# Patient Record
Sex: MALE | Race: WHITE | NOT HISPANIC OR LATINO | Employment: OTHER | URBAN - METROPOLITAN AREA
[De-identification: names, ages, dates, MRNs, and addresses within clinical notes are randomized per-mention and may not be internally consistent; named-entity substitution may affect disease eponyms.]

---

## 2023-05-25 ENCOUNTER — OFFICE VISIT (OUTPATIENT)
Dept: PRIMARY CARE | Facility: CLINIC | Age: 88
End: 2023-05-25
Payer: COMMERCIAL

## 2023-05-25 VITALS
WEIGHT: 135 LBS | OXYGEN SATURATION: 98 % | HEART RATE: 52 BPM | TEMPERATURE: 97.1 F | SYSTOLIC BLOOD PRESSURE: 148 MMHG | DIASTOLIC BLOOD PRESSURE: 80 MMHG

## 2023-05-25 DIAGNOSIS — I70.0 ATHEROSCLEROSIS OF AORTA (CMS-HCC): ICD-10-CM

## 2023-05-25 DIAGNOSIS — I71.40 ABDOMINAL AORTIC ANEURYSM (AAA) WITHOUT RUPTURE, UNSPECIFIED PART (CMS-HCC): ICD-10-CM

## 2023-05-25 DIAGNOSIS — D64.9 ANEMIA, UNSPECIFIED TYPE: ICD-10-CM

## 2023-05-25 DIAGNOSIS — I25.10 CORONARY ARTERY DISEASE INVOLVING NATIVE CORONARY ARTERY OF NATIVE HEART WITHOUT ANGINA PECTORIS: ICD-10-CM

## 2023-05-25 DIAGNOSIS — I10 ESSENTIAL HYPERTENSION: Primary | ICD-10-CM

## 2023-05-25 DIAGNOSIS — N18.30 STAGE 3 CHRONIC KIDNEY DISEASE, UNSPECIFIED WHETHER STAGE 3A OR 3B CKD (MULTI): ICD-10-CM

## 2023-05-25 PROBLEM — K59.00 CONSTIPATION: Status: ACTIVE | Noted: 2019-03-20

## 2023-05-25 PROBLEM — D01.0 CARCINOMA IN SITU OF COLON: Status: ACTIVE | Noted: 2017-07-25

## 2023-05-25 PROBLEM — D07.5 CARCINOMA IN SITU OF PROSTATE: Status: ACTIVE | Noted: 2017-07-25

## 2023-05-25 PROBLEM — N18.31 CHRONIC KIDNEY DISEASE, STAGE 3A (MULTI): Status: ACTIVE | Noted: 2019-03-20

## 2023-05-25 PROBLEM — K21.9 GASTROESOPHAGEAL REFLUX DISEASE: Status: ACTIVE | Noted: 2019-03-20

## 2023-05-25 PROBLEM — Z98.61 STATUS POST PERCUTANEOUS TRANSLUMINAL CORONARY ANGIOPLASTY: Status: ACTIVE | Noted: 2020-07-29

## 2023-05-25 PROBLEM — E78.5 HYPERLIPIDEMIA: Status: ACTIVE | Noted: 2017-01-01

## 2023-05-25 PROBLEM — K40.90 INGUINAL HERNIA: Status: ACTIVE | Noted: 2020-07-29

## 2023-05-25 PROBLEM — D63.1 ANEMIA OF CHRONIC RENAL FAILURE: Status: ACTIVE | Noted: 2019-03-20

## 2023-05-25 PROBLEM — N18.9 ANEMIA OF CHRONIC RENAL FAILURE: Status: ACTIVE | Noted: 2019-03-20

## 2023-05-25 PROCEDURE — 3077F SYST BP >= 140 MM HG: CPT | Performed by: PHYSICIAN ASSISTANT

## 2023-05-25 PROCEDURE — 3079F DIAST BP 80-89 MM HG: CPT | Performed by: PHYSICIAN ASSISTANT

## 2023-05-25 PROCEDURE — 1036F TOBACCO NON-USER: CPT | Performed by: PHYSICIAN ASSISTANT

## 2023-05-25 PROCEDURE — 1159F MED LIST DOCD IN RCRD: CPT | Performed by: PHYSICIAN ASSISTANT

## 2023-05-25 PROCEDURE — 99214 OFFICE O/P EST MOD 30 MIN: CPT | Performed by: PHYSICIAN ASSISTANT

## 2023-05-25 PROCEDURE — 1160F RVW MEDS BY RX/DR IN RCRD: CPT | Performed by: PHYSICIAN ASSISTANT

## 2023-05-25 RX ORDER — AMLODIPINE BESYLATE 10 MG/1
10 TABLET ORAL DAILY
Qty: 90 TABLET | Refills: 1 | Status: SHIPPED | OUTPATIENT
Start: 2023-05-25 | End: 2023-09-29 | Stop reason: SDUPTHER

## 2023-05-25 RX ORDER — LATANOPROST 50 UG/ML
1 SOLUTION/ DROPS OPHTHALMIC DAILY
COMMUNITY
Start: 2023-02-27

## 2023-05-25 RX ORDER — DOCUSATE SODIUM 100 MG/1
100 CAPSULE, LIQUID FILLED ORAL 2 TIMES DAILY
COMMUNITY
Start: 2018-12-12 | End: 2023-05-25 | Stop reason: WASHOUT

## 2023-05-25 RX ORDER — AMLODIPINE BESYLATE 10 MG/1
10 TABLET ORAL DAILY
COMMUNITY
Start: 2021-01-26 | End: 2023-05-25 | Stop reason: SDUPTHER

## 2023-05-25 RX ORDER — LANOLIN ALCOHOL/MO/W.PET/CERES
100 CREAM (GRAM) TOPICAL DAILY
COMMUNITY

## 2023-05-25 RX ORDER — ASPIRIN 81 MG/1
81 TABLET ORAL DAILY
COMMUNITY

## 2023-05-25 ASSESSMENT — ENCOUNTER SYMPTOMS
PALPITATIONS: 0
ABDOMINAL PAIN: 0
LIGHT-HEADEDNESS: 0

## 2023-08-15 ENCOUNTER — OFFICE VISIT (OUTPATIENT)
Dept: PRIMARY CARE | Facility: CLINIC | Age: 88
End: 2023-08-15
Payer: COMMERCIAL

## 2023-08-15 VITALS
SYSTOLIC BLOOD PRESSURE: 130 MMHG | TEMPERATURE: 97 F | OXYGEN SATURATION: 97 % | HEART RATE: 49 BPM | WEIGHT: 136 LBS | DIASTOLIC BLOOD PRESSURE: 90 MMHG

## 2023-08-15 DIAGNOSIS — R35.1 NOCTURIA: ICD-10-CM

## 2023-08-15 DIAGNOSIS — R30.0 BURNING WITH URINATION: Primary | ICD-10-CM

## 2023-08-15 LAB
POC APPEARANCE, URINE: CLEAR
POC BILIRUBIN, URINE: NEGATIVE
POC BLOOD, URINE: NEGATIVE
POC COLOR, URINE: YELLOW
POC GLUCOSE, URINE: NEGATIVE MG/DL
POC KETONES, URINE: NEGATIVE MG/DL
POC LEUKOCYTES, URINE: NEGATIVE
POC NITRITE,URINE: NEGATIVE
POC PH, URINE: 7 PH
POC PROTEIN, URINE: ABNORMAL MG/DL
POC SPECIFIC GRAVITY, URINE: 1.01
POC UROBILINOGEN, URINE: 0.2 EU/DL

## 2023-08-15 PROCEDURE — 1159F MED LIST DOCD IN RCRD: CPT | Performed by: PHYSICIAN ASSISTANT

## 2023-08-15 PROCEDURE — 3080F DIAST BP >= 90 MM HG: CPT | Performed by: PHYSICIAN ASSISTANT

## 2023-08-15 PROCEDURE — 99214 OFFICE O/P EST MOD 30 MIN: CPT | Performed by: PHYSICIAN ASSISTANT

## 2023-08-15 PROCEDURE — 3075F SYST BP GE 130 - 139MM HG: CPT | Performed by: PHYSICIAN ASSISTANT

## 2023-08-15 PROCEDURE — 1036F TOBACCO NON-USER: CPT | Performed by: PHYSICIAN ASSISTANT

## 2023-08-15 PROCEDURE — 81003 URINALYSIS AUTO W/O SCOPE: CPT | Performed by: PHYSICIAN ASSISTANT

## 2023-08-15 PROCEDURE — 87086 URINE CULTURE/COLONY COUNT: CPT

## 2023-08-15 PROCEDURE — 1160F RVW MEDS BY RX/DR IN RCRD: CPT | Performed by: PHYSICIAN ASSISTANT

## 2023-08-15 RX ORDER — SULFAMETHOXAZOLE AND TRIMETHOPRIM 800; 160 MG/1; MG/1
1 TABLET ORAL 2 TIMES DAILY
Qty: 6 TABLET | Refills: 0 | Status: SHIPPED | OUTPATIENT
Start: 2023-08-15 | End: 2023-08-18

## 2023-08-15 RX ORDER — TAMSULOSIN HYDROCHLORIDE 0.4 MG/1
0.4 CAPSULE ORAL DAILY
Qty: 30 CAPSULE | Refills: 3 | Status: SHIPPED | OUTPATIENT
Start: 2023-08-15 | End: 2023-11-27 | Stop reason: SDUPTHER

## 2023-08-15 ASSESSMENT — ENCOUNTER SYMPTOMS
ABDOMINAL PAIN: 0
PALPITATIONS: 0
SHORTNESS OF BREATH: 0

## 2023-08-15 NOTE — PROGRESS NOTES
Subjective   Patient ID: Massimo Perez is a 89 y.o. male who presents for burning with urination x 2 wks.    HPI   Patient complains of having mild dysuria x 2 weeks.  Has urinary frequency. No abdominal pain or flank pain. No fevers/chillsno OMov    Also complains of chronic nocturia increasing over the past few years-- wakes 3-4 times per night to urinate. Wants to try some medicine to help this.      reports that he quit smoking about 69 years ago. His smoking use included cigarettes. He has never used smokeless tobacco.    Review of Systems   Respiratory:  Negative for shortness of breath.    Cardiovascular:  Negative for chest pain and palpitations.   Gastrointestinal:  Negative for abdominal pain.       Objective   /90   Pulse (!) 49   Temp 36.1 °C (97 °F)   Wt 61.7 kg (136 lb)   SpO2 97%     Physical Exam  HENT:      Head: Normocephalic.   Eyes:      General: No scleral icterus.  Cardiovascular:      Rate and Rhythm: Normal rate and regular rhythm.   Pulmonary:      Effort: Pulmonary effort is normal.      Breath sounds: Normal breath sounds.   Abdominal:      Palpations: Abdomen is soft. There is no mass.      Tenderness: There is no abdominal tenderness. There is no right CVA tenderness or left CVA tenderness.   Skin:     General: Skin is warm and dry.   Neurological:      Mental Status: He is alert.   Psychiatric:         Mood and Affect: Affect normal.       Office Visit on 08/15/2023   Component Date Value Ref Range Status    POC Color, Urine 08/15/2023 Yellow  Straw, Yellow, Light Yellow Final    POC Appearance, Urine 08/15/2023 Clear  Clear Final    POC Specific Gravity, Urine 08/15/2023 1.015  1.005 - 1.035 Final    POC PH, Urine 08/15/2023 7.0  No Reference Range Established PH Final    POC Protein, Urine 08/15/2023 TRACE (A)  NEGATIVE, 30 (1+) mg/dl Final    POC Glucose, Urine 08/15/2023 NEGATIVE  NEGATIVE mg/dl Final    POC Blood, Urine 08/15/2023 NEGATIVE  NEGATIVE Final    POC Ketones,  Urine 08/15/2023 NEGATIVE  NEGATIVE mg/dl Final    POC Bilirubin, Urine 08/15/2023 NEGATIVE  NEGATIVE Final    POC Urobilinogen, Urine 08/15/2023 0.2  0.2, 1.0 EU/DL Final    Poc Nitrate, Urine 08/15/2023 NEGATIVE  NEGATIVE Final    POC Leukocytes, Urine 08/15/2023 NEGATIVE  NEGATIVE Final    Urine Culture 08/15/2023 **Culture Comments - See Below   Final          Assessment/Plan   Diagnoses and all orders for this visit:  Burning with urination  -     POCT UA Automated manually resulted  -     Urine Culture  -     sulfamethoxazole-trimethoprim (Bactrim DS) 800-160 mg tablet; Take 1 tablet by mouth 2 times a day for 3 days.  Nocturia  -     tamsulosin (Flomax) 0.4 mg 24 hr capsule; Take 1 capsule (0.4 mg) by mouth once daily.       Reviewed UA. Send urine for cx.   Cover with Bactrim bid x 3 days  Discussed probable prostatic enlargement  that is contributing to nocturia. Start Rx Fllomax.   Hydrate well.   Follow up if symptoms increase, otherwise follow up for routine recheck in 11/2023 as scheduled.

## 2023-08-17 LAB — URINE CULTURE: NORMAL

## 2023-09-29 ENCOUNTER — TELEPHONE (OUTPATIENT)
Dept: PRIMARY CARE | Facility: CLINIC | Age: 88
End: 2023-09-29
Payer: COMMERCIAL

## 2023-09-29 DIAGNOSIS — I10 ESSENTIAL HYPERTENSION: ICD-10-CM

## 2023-09-29 RX ORDER — AMLODIPINE BESYLATE 10 MG/1
10 TABLET ORAL DAILY
Qty: 90 TABLET | Refills: 0 | Status: SHIPPED | OUTPATIENT
Start: 2023-09-29 | End: 2023-11-27 | Stop reason: SDUPTHER

## 2023-09-29 NOTE — TELEPHONE ENCOUNTER
Pt requests a refill on Amlodipine 10mg. He also requests to bump the prescription up if possible. Pt uses [facility]

## 2023-11-27 ENCOUNTER — LAB (OUTPATIENT)
Dept: LAB | Facility: LAB | Age: 88
End: 2023-11-27
Payer: COMMERCIAL

## 2023-11-27 ENCOUNTER — OFFICE VISIT (OUTPATIENT)
Dept: PRIMARY CARE | Facility: CLINIC | Age: 88
End: 2023-11-27
Payer: COMMERCIAL

## 2023-11-27 VITALS
OXYGEN SATURATION: 97 % | HEIGHT: 72 IN | BODY MASS INDEX: 18.28 KG/M2 | WEIGHT: 135 LBS | SYSTOLIC BLOOD PRESSURE: 140 MMHG | TEMPERATURE: 96.7 F | HEART RATE: 68 BPM | DIASTOLIC BLOOD PRESSURE: 80 MMHG

## 2023-11-27 DIAGNOSIS — I10 ESSENTIAL HYPERTENSION: Primary | ICD-10-CM

## 2023-11-27 DIAGNOSIS — R35.1 NOCTURIA: ICD-10-CM

## 2023-11-27 DIAGNOSIS — N18.30 STAGE 3 CHRONIC KIDNEY DISEASE, UNSPECIFIED WHETHER STAGE 3A OR 3B CKD (MULTI): ICD-10-CM

## 2023-11-27 DIAGNOSIS — I70.0 ATHEROSCLEROSIS OF AORTA (CMS-HCC): ICD-10-CM

## 2023-11-27 DIAGNOSIS — I10 ESSENTIAL HYPERTENSION: ICD-10-CM

## 2023-11-27 DIAGNOSIS — I70.1 ATHEROSCLEROSIS OF RENAL ARTERY (CMS-HCC): ICD-10-CM

## 2023-11-27 DIAGNOSIS — I71.40 ABDOMINAL AORTIC ANEURYSM (AAA) WITHOUT RUPTURE, UNSPECIFIED PART (CMS-HCC): ICD-10-CM

## 2023-11-27 DIAGNOSIS — I25.10 CORONARY ARTERY DISEASE INVOLVING NATIVE CORONARY ARTERY OF NATIVE HEART WITHOUT ANGINA PECTORIS: ICD-10-CM

## 2023-11-27 DIAGNOSIS — D64.9 ANEMIA, UNSPECIFIED TYPE: ICD-10-CM

## 2023-11-27 DIAGNOSIS — Z00.00 ROUTINE GENERAL MEDICAL EXAMINATION AT HEALTH CARE FACILITY: ICD-10-CM

## 2023-11-27 PROBLEM — Z85.46 HISTORY OF PROSTATE CANCER: Status: ACTIVE | Noted: 2023-11-27

## 2023-11-27 PROBLEM — Z85.038 HISTORY OF COLON CANCER: Status: ACTIVE | Noted: 2023-11-27

## 2023-11-27 LAB
ANION GAP SERPL CALC-SCNC: 11 MMOL/L (ref 10–20)
BASOPHILS # BLD AUTO: 0.04 X10*3/UL (ref 0–0.1)
BASOPHILS NFR BLD AUTO: 0.8 %
BUN SERPL-MCNC: 22 MG/DL (ref 6–23)
CALCIUM SERPL-MCNC: 9.8 MG/DL (ref 8.6–10.3)
CHLORIDE SERPL-SCNC: 105 MMOL/L (ref 98–107)
CO2 SERPL-SCNC: 27 MMOL/L (ref 21–32)
CREAT SERPL-MCNC: 1.64 MG/DL (ref 0.5–1.3)
EOSINOPHIL # BLD AUTO: 0.06 X10*3/UL (ref 0–0.4)
EOSINOPHIL NFR BLD AUTO: 1.1 %
ERYTHROCYTE [DISTWIDTH] IN BLOOD BY AUTOMATED COUNT: 14 % (ref 11.5–14.5)
FERRITIN SERPL-MCNC: 167 NG/ML (ref 20–300)
GFR SERPL CREATININE-BSD FRML MDRD: 39 ML/MIN/1.73M*2
GLUCOSE SERPL-MCNC: 87 MG/DL (ref 74–99)
HCT VFR BLD AUTO: 36 % (ref 41–52)
HGB BLD-MCNC: 11.7 G/DL (ref 13.5–17.5)
IMM GRANULOCYTES # BLD AUTO: 0.02 X10*3/UL (ref 0–0.5)
IMM GRANULOCYTES NFR BLD AUTO: 0.4 % (ref 0–0.9)
IRON SERPL-MCNC: 91 UG/DL (ref 35–150)
LYMPHOCYTES # BLD AUTO: 1.62 X10*3/UL (ref 0.8–3)
LYMPHOCYTES NFR BLD AUTO: 30.5 %
MCH RBC QN AUTO: 33.5 PG (ref 26–34)
MCHC RBC AUTO-ENTMCNC: 32.5 G/DL (ref 32–36)
MCV RBC AUTO: 103 FL (ref 80–100)
MONOCYTES # BLD AUTO: 0.64 X10*3/UL (ref 0.05–0.8)
MONOCYTES NFR BLD AUTO: 12.1 %
NEUTROPHILS # BLD AUTO: 2.93 X10*3/UL (ref 1.6–5.5)
NEUTROPHILS NFR BLD AUTO: 55.1 %
NRBC BLD-RTO: 0 /100 WBCS (ref 0–0)
PLATELET # BLD AUTO: 186 X10*3/UL (ref 150–450)
POTASSIUM SERPL-SCNC: 4.7 MMOL/L (ref 3.5–5.3)
RBC # BLD AUTO: 3.49 X10*6/UL (ref 4.5–5.9)
SODIUM SERPL-SCNC: 138 MMOL/L (ref 136–145)
VIT B12 SERPL-MCNC: 343 PG/ML (ref 211–911)
WBC # BLD AUTO: 5.3 X10*3/UL (ref 4.4–11.3)

## 2023-11-27 PROCEDURE — 1160F RVW MEDS BY RX/DR IN RCRD: CPT | Performed by: PHYSICIAN ASSISTANT

## 2023-11-27 PROCEDURE — 82607 VITAMIN B-12: CPT

## 2023-11-27 PROCEDURE — 99214 OFFICE O/P EST MOD 30 MIN: CPT | Performed by: PHYSICIAN ASSISTANT

## 2023-11-27 PROCEDURE — 1036F TOBACCO NON-USER: CPT | Performed by: PHYSICIAN ASSISTANT

## 2023-11-27 PROCEDURE — 82728 ASSAY OF FERRITIN: CPT

## 2023-11-27 PROCEDURE — 3079F DIAST BP 80-89 MM HG: CPT | Performed by: PHYSICIAN ASSISTANT

## 2023-11-27 PROCEDURE — 36415 COLL VENOUS BLD VENIPUNCTURE: CPT

## 2023-11-27 PROCEDURE — 85025 COMPLETE CBC W/AUTO DIFF WBC: CPT

## 2023-11-27 PROCEDURE — 80048 BASIC METABOLIC PNL TOTAL CA: CPT

## 2023-11-27 PROCEDURE — 1159F MED LIST DOCD IN RCRD: CPT | Performed by: PHYSICIAN ASSISTANT

## 2023-11-27 PROCEDURE — 1170F FXNL STATUS ASSESSED: CPT | Performed by: PHYSICIAN ASSISTANT

## 2023-11-27 PROCEDURE — G0439 PPPS, SUBSEQ VISIT: HCPCS | Performed by: PHYSICIAN ASSISTANT

## 2023-11-27 PROCEDURE — 83540 ASSAY OF IRON: CPT

## 2023-11-27 PROCEDURE — 3077F SYST BP >= 140 MM HG: CPT | Performed by: PHYSICIAN ASSISTANT

## 2023-11-27 RX ORDER — AMLODIPINE BESYLATE 10 MG/1
10 TABLET ORAL DAILY
Qty: 90 TABLET | Refills: 1 | Status: SHIPPED | OUTPATIENT
Start: 2023-11-27 | End: 2024-01-17 | Stop reason: SDUPTHER

## 2023-11-27 RX ORDER — TAMSULOSIN HYDROCHLORIDE 0.4 MG/1
0.4 CAPSULE ORAL DAILY
Qty: 30 CAPSULE | Refills: 5 | Status: SHIPPED | OUTPATIENT
Start: 2023-11-27

## 2023-11-27 ASSESSMENT — ACTIVITIES OF DAILY LIVING (ADL)
BATHING: INDEPENDENT
DOING_HOUSEWORK: INDEPENDENT
DRESSING: INDEPENDENT
TAKING_MEDICATION: INDEPENDENT
MANAGING_FINANCES: INDEPENDENT
GROCERY_SHOPPING: INDEPENDENT

## 2023-11-27 ASSESSMENT — ENCOUNTER SYMPTOMS
LIGHT-HEADEDNESS: 0
PALPITATIONS: 0
ABDOMINAL PAIN: 0

## 2023-11-27 ASSESSMENT — PATIENT HEALTH QUESTIONNAIRE - PHQ9
1. LITTLE INTEREST OR PLEASURE IN DOING THINGS: NOT AT ALL
2. FEELING DOWN, DEPRESSED OR HOPELESS: NOT AT ALL
SUM OF ALL RESPONSES TO PHQ9 QUESTIONS 1 AND 2: 0

## 2023-11-27 NOTE — PROGRESS NOTES
Subjective   Patient ID: Massimo SANGITA Chris is a 89 y.o. male who presents for Medicare Annual Wellness Visit Subsequent and Hypertension (Ckd recheck ).    HPI   Patient presents for recheck of HTN CKD, and anemia.  And AWE.     HTN: Taking and tolerating amlodipine. BP stable today. States he sometimes is missing taking his medicine. If BP is really high when he checks it, he will then take 2 pills.  Denies headache or dizziness.    Severe atherosclerosis of Aorta, mesenteric and renal arteries - denies abdominal pain or chest pains.  Declines further evaluation of this.     AAA: slight growth on CT 2021 but still considered small. No symptoms.  Declines further evaluation of it.     Anemia: chronic and stable. Hgb 12.2 on 22 labs. Takes B12.  Did not get labs for this visit.     CKD 3a: Creatinine 1.38 and GFR 48 on labs 22.     Continues to see eye Dr Peralta and Dr Davison.      reports that he quit smoking about 69 years ago. His smoking use included cigarettes. He has never used smokeless tobacco.    Stays active.  Planted a garden with lots of tomatoes. Gets some aches in knees from arthritis.   Rides an exercise bike a little.     In 10/2022 his son (Dr. Massimo Perez, [facility])  from [place].     Gets nocturia for several years. Never started the Flomax after last visit.   Nocturia about 3x per night.       Review of Systems   Cardiovascular:  Negative for chest pain and palpitations.   Gastrointestinal:  Negative for abdominal pain.   Neurological:  Negative for light-headedness.         Objective   /80   Pulse 68   Temp 35.9 °C (96.7 °F)   Ht 1.829 m (6')   Wt 61.2 kg (135 lb)   SpO2 97%   BMI 18.31 kg/m²     Physical Exam  HENT:      Head: Normocephalic.   Eyes:      General: No scleral icterus.  Cardiovascular:      Rate and Rhythm: Normal rate and regular rhythm.   Pulmonary:      Effort: Pulmonary effort is normal.      Breath sounds: Normal breath sounds.   Abdominal:      Palpations:  Abdomen is soft. There is no mass.      Tenderness: There is no abdominal tenderness.   Skin:     General: Skin is warm and dry.   Neurological:      Mental Status: He is alert.   Psychiatric:         Mood and Affect: Affect normal.           Assessment/Plan   Diagnoses and all orders for this visit:  Essential hypertension  -     amLODIPine (Norvasc) 10 mg tablet; Take 1 tablet (10 mg) by mouth once daily.  -     Basic Metabolic Panel; Future  -     CBC and Auto Differential; Future  Anemia, unspecified type  -     CBC and Auto Differential; Future  -     Iron; Future  -     Vitamin B12; Future  -     Ferritin; Future  Coronary artery disease involving native coronary artery of native heart without angina pectoris  Stage 3 chronic kidney disease, unspecified whether stage 3a or 3b CKD (CMS/HCC)  -     Basic Metabolic Panel; Future  Atherosclerosis of aorta (CMS/HCC)  Abdominal aortic aneurysm (AAA) without rupture, unspecified part (CMS/[facility])  Nocturia  -     tamsulosin (Flomax) 0.4 mg 24 hr capsule; Take 1 capsule (0.4 mg) by mouth once daily.  Routine general medical examination at health care facility  Atherosclerosis of renal artery (CMS/HCC)       Discussed importance of compliance with taking his blood pressure medication every day.  Monitor blood pressure.  Get labs  Discussed probable prostatic enlargement  that is contributing to nocturia. Start Rx Flomax.    Limit sodium in diet.  Encouraged to stay active.  Follow-up in 6 months for recheck HTN, CKD, Anemia, earlier if needed.

## 2023-11-27 NOTE — PROGRESS NOTES
Subjective   Reason for Visit: Massimo Perez is an 89 y.o. male here for a Medicare Wellness visit.     Past Medical, Surgical, and Family History reviewed and updated in chart.    Reviewed all medications by prescribing practitioner or clinical pharmacist (such as prescriptions, OTCs, herbal therapies and supplements) and documented in the medical record.    HPI    Annual Wellness visit.       Reviewed patient's answers to all Medicare screening questionnaire questions regarding falls, depression, general health status, nutrition and exercise, functional ability/level of safety, home safety risks, and ADLs/IADLs.      Healthcare POA and Living Will status:  has this in place -- his daughter.      Reviewed meds and discussed consistent use of meds as prescribed. Is not taking any high risk controlled/opioid medications.        Immunizations: recommend yearly flu shot in the fall. Got this last month.      No trouble with bathing, dressing, preparing meals, eating, with mobility, toileting, house work, managing money, medications, using telephone, shopping or transportation.  Reports no falls.   The home has grab bars in the bathroom and handrails on the stairs. The home does not have poor lighting, clutter, uneven floors, or loose rugs.  No diet restrictions.    Wears hearing aids.      Bladder - continent, but has nocturia x 2-3.   Reports good urinary stream  Bowel - continent     Opioid use - no.  Medications reviewed.   Patient  is compliant with medications: Yes  High risk medications: None     Exercise -  yes,  does strengthening and toning. Stays active.  Planted a garden with lots of tomatoes. Gets some aches in knees from arthritis.   Rides an exercise bike a little.     Diet - well balanced, heart healthy.  Pain score - 1/10 (aches in knees)     Lives alone.  Wife    Has 2 daughters that are involved -- his son, Dr Massimo Perez,  10/2022 from [place].      Cognitive status is good. Answers  questions, discusses medical problems voices complaints coherently and appropriately. Does not repeat himself regarding comments or questions.      Reviewed all diagnosis with patient.  Additional diagnosis: None  Comorbid conditions: HTN, HLD < vascular disease       Screening:  Patient is a diabetic:   No  Colonoscopy: not indicated  age         Providers:  [facility]   Sees eye Dr Peralta and Dr Davison.   Patient Care Team:  Kali Joe PA-C as PCP - General  Kali Joe PA-C as PCP - [employer] PCP     Review of Systems   Respiratory:  Negative for shortness of breath.    Cardiovascular:  Negative for chest pain and palpitations.   Gastrointestinal:  Negative for abdominal pain.       Objective   Vitals:  /80   Pulse 68   Temp 35.9 °C (96.7 °F)   Ht 1.829 m (6')   Wt 61.2 kg (135 lb)   SpO2 97%   BMI 18.31 kg/m²       Physical Exam  Vitals and nursing note reviewed.   HENT:      Head: Normocephalic.   Eyes:      General: No scleral icterus.  Cardiovascular:      Rate and Rhythm: Normal rate and regular rhythm.   Pulmonary:      Effort: Pulmonary effort is normal.      Breath sounds: Normal breath sounds.   Abdominal:      Palpations: Abdomen is soft. There is no mass.      Tenderness: There is no abdominal tenderness.   Skin:     General: Skin is warm and dry.   Neurological:      Mental Status: He is alert.   Psychiatric:         Mood and Affect: Affect normal.         Assessment/Plan   Problem List Items Addressed This Visit       Abdominal aortic aneurysm without rupture (CMS/HCC)    Atherosclerosis of aorta (CMS/HCC)    Coronary artery disease involving native coronary artery of native heart without angina pectoris    Relevant Medications    amLODIPine (Norvasc) 10 mg tablet    Essential hypertension - Primary    Relevant Medications    amLODIPine (Norvasc) 10 mg tablet    Other Relevant Orders    Basic Metabolic Panel (Completed)    CBC and Auto Differential  (Completed)    Anemia    Relevant Orders    CBC and Auto Differential (Completed)    Iron (Completed)    Vitamin B12 (Completed)    Ferritin (Completed)    Atherosclerosis of renal artery (CMS/HCC)     Other Visit Diagnoses       Stage 3 chronic kidney disease, unspecified whether stage 3a or 3b CKD (CMS/HCC)        Relevant Orders    Basic Metabolic Panel (Completed)    Nocturia        Relevant Medications    tamsulosin (Flomax) 0.4 mg 24 hr capsule    Routine general medical examination at health care facility                 Discussed wellness issues.   Discussed diet.   Gets Flu shot yearly. Got COVID vaccine booster recently.   Continue healthy diet and exercise.

## 2023-12-01 ENCOUNTER — TELEPHONE (OUTPATIENT)
Dept: PRIMARY CARE | Facility: CLINIC | Age: 88
End: 2023-12-01
Payer: COMMERCIAL

## 2023-12-01 ASSESSMENT — ENCOUNTER SYMPTOMS
ABDOMINAL PAIN: 0
PALPITATIONS: 0
SHORTNESS OF BREATH: 0

## 2023-12-01 NOTE — TELEPHONE ENCOUNTER
----- Message from Kali Joe PA-C sent at 12/1/2023 12:39 AM EST -----  Inform pt that his labs showed that his anemia is slightly worse. I would like for him to start taking an OTC Vit B12 supplement. Get Vit B12 1000mcg 1 pill per day -- this should help this improve. His kidney function is slightly lower than it was before.  Make sure he avoids using any ibuprofen, Advil, and aleve (Tylenol is ok).  Make sure drinking plenty of water too.  Rest of labs all ok.

## 2024-01-01 ENCOUNTER — TELEPHONE (OUTPATIENT)
Dept: PRIMARY CARE | Facility: CLINIC | Age: 89
End: 2024-01-01
Payer: MEDICARE

## 2024-01-17 DIAGNOSIS — I10 ESSENTIAL HYPERTENSION: ICD-10-CM

## 2024-01-17 RX ORDER — AMLODIPINE BESYLATE 10 MG/1
10 TABLET ORAL DAILY
Qty: 90 TABLET | Refills: 1 | Status: SHIPPED | OUTPATIENT
Start: 2024-01-17

## 2024-01-17 NOTE — TELEPHONE ENCOUNTER
Pt has insurance update and now uses [facility]. Pt would like a refill on Amlodipine 10MG, 90 days. Pt has about a week or more left pls advise. Next ov 5/28 last ov 11/27

## 2024-06-19 ENCOUNTER — TELEPHONE (OUTPATIENT)
Dept: PRIMARY CARE | Facility: CLINIC | Age: 89
End: 2024-06-19
Payer: MEDICARE

## 2024-06-19 NOTE — TELEPHONE ENCOUNTER
Pt came in asking for a 90 day supply refill on his amlodipine. Only has 3 pills left.   Please send to [facility]

## 2024-06-21 ENCOUNTER — OFFICE VISIT (OUTPATIENT)
Dept: PRIMARY CARE | Facility: CLINIC | Age: 89
End: 2024-06-21
Payer: MEDICARE

## 2024-06-21 VITALS
DIASTOLIC BLOOD PRESSURE: 58 MMHG | WEIGHT: 126 LBS | BODY MASS INDEX: 17.09 KG/M2 | SYSTOLIC BLOOD PRESSURE: 130 MMHG | TEMPERATURE: 97.4 F | HEART RATE: 60 BPM

## 2024-06-21 DIAGNOSIS — R35.1 NOCTURIA: ICD-10-CM

## 2024-06-21 DIAGNOSIS — I10 ESSENTIAL HYPERTENSION: Primary | ICD-10-CM

## 2024-06-21 DIAGNOSIS — N18.30 STAGE 3 CHRONIC KIDNEY DISEASE, UNSPECIFIED WHETHER STAGE 3A OR 3B CKD (MULTI): ICD-10-CM

## 2024-06-21 DIAGNOSIS — D64.9 ANEMIA, UNSPECIFIED TYPE: ICD-10-CM

## 2024-06-21 DIAGNOSIS — I71.40 ABDOMINAL AORTIC ANEURYSM (AAA) WITHOUT RUPTURE, UNSPECIFIED PART (CMS-HCC): ICD-10-CM

## 2024-06-21 DIAGNOSIS — I70.1 ATHEROSCLEROSIS OF RENAL ARTERY (CMS-HCC): ICD-10-CM

## 2024-06-21 DIAGNOSIS — I25.10 CORONARY ARTERY DISEASE INVOLVING NATIVE CORONARY ARTERY OF NATIVE HEART WITHOUT ANGINA PECTORIS: ICD-10-CM

## 2024-06-21 DIAGNOSIS — I70.0 ATHEROSCLEROSIS OF AORTA (CMS-HCC): ICD-10-CM

## 2024-06-21 PROBLEM — D01.0 CARCINOMA IN SITU OF COLON: Status: RESOLVED | Noted: 2017-07-25 | Resolved: 2024-06-21

## 2024-06-21 PROCEDURE — 99214 OFFICE O/P EST MOD 30 MIN: CPT | Performed by: PHYSICIAN ASSISTANT

## 2024-06-21 PROCEDURE — 1036F TOBACCO NON-USER: CPT | Performed by: PHYSICIAN ASSISTANT

## 2024-06-21 PROCEDURE — 1160F RVW MEDS BY RX/DR IN RCRD: CPT | Performed by: PHYSICIAN ASSISTANT

## 2024-06-21 PROCEDURE — 1159F MED LIST DOCD IN RCRD: CPT | Performed by: PHYSICIAN ASSISTANT

## 2024-06-21 PROCEDURE — 3078F DIAST BP <80 MM HG: CPT | Performed by: PHYSICIAN ASSISTANT

## 2024-06-21 PROCEDURE — 3075F SYST BP GE 130 - 139MM HG: CPT | Performed by: PHYSICIAN ASSISTANT

## 2024-06-21 RX ORDER — AMLODIPINE BESYLATE 10 MG/1
10 TABLET ORAL DAILY
Qty: 90 TABLET | Refills: 1 | Status: SHIPPED | OUTPATIENT
Start: 2024-06-21

## 2024-06-21 RX ORDER — TAMSULOSIN HYDROCHLORIDE 0.4 MG/1
0.4 CAPSULE ORAL DAILY
Qty: 90 CAPSULE | Refills: 2 | Status: SHIPPED | OUTPATIENT
Start: 2024-06-21

## 2024-06-21 ASSESSMENT — ENCOUNTER SYMPTOMS
PALPITATIONS: 0
ABDOMINAL PAIN: 0
LIGHT-HEADEDNESS: 0

## 2024-06-21 NOTE — PROGRESS NOTES
Subjective   Patient ID: Massimo Perez is a 89 y.o. male who presents for Hypertension (Recheck ).    HPI   Patient presents for recheck of HTN CKD, and anemia.      HTN: Taking and tolerating amlodipine. BP has been good.  Denies headache or dizziness.    Severe atherosclerosis of Aorta, mesenteric and renal arteries - denies abdominal pain or chest pains.  Declines further evaluation of this.     AAA: slight growth on CT 2021 but still considered small. No symptoms.  Declines further evaluation of it.     Anemia: chronic and fairly stable.  Takes B12 and sometimes takes a little iron.  Did not get labs for this visit. Energy level stable. No blood or melena in stools.      Lab Results   Component Value Date    HGB 11.7 (L) 2023    HGB 12.2 (L) 2022    IRON 91 2023    IRON 65 2021    FERRITIN 167 2023    FERRITIN 247 2021    MOMTIIWS96 343 2023    [id number] 427 2021       CKD 3a:  Slightly higher creatinine on 23 labs.   Lab Results   Component Value Date    CREATININE 1.64 (H) 2023    CREATININE 1.38 (H) 2022    CREATININE 1.07 2021    GFRMALE 48 (A) 2022     Nocturia: Started Flomax after last visit and it seems to help reduce the amount of nocturia.       Continues to see eye Dr Peralta and Dr Davison.      reports that he quit smoking about 70 years ago. His smoking use included cigarettes. He has never used smokeless tobacco.    Stays active.  Planted a garden with lots of tomatoes. Gets some aches in knees from arthritis but not too bad -- walks with a cane.  Rides an exercise bike a little.   Admits he hasn't been eating as much as before-- not much appetite. Lost a little wt.     In 10/2022 his son (Dr. Massimo Perez, [facility])  from [place].       Review of Systems   Cardiovascular:  Negative for chest pain and palpitations.   Gastrointestinal:  Negative for abdominal pain.   Neurological:  Negative for light-headedness.          Objective   /58   Pulse 60   Temp 36.3 °C (97.4 °F)   Wt 57.2 kg (126 lb)   BMI 17.09 kg/m²     Physical Exam  HENT:      Head: Normocephalic.   Eyes:      General: No scleral icterus.  Cardiovascular:      Rate and Rhythm: Normal rate and regular rhythm.   Pulmonary:      Effort: Pulmonary effort is normal.      Breath sounds: Normal breath sounds.   Abdominal:      Palpations: Abdomen is soft. There is no mass.      Tenderness: There is no abdominal tenderness.   Skin:     General: Skin is warm and dry.   Neurological:      Mental Status: He is alert.   Psychiatric:         Mood and Affect: Affect normal.           Assessment/Plan   Diagnoses and all orders for this visit:  Essential hypertension  -     amLODIPine (Norvasc) 10 mg tablet; Take 1 tablet (10 mg) by mouth once daily.  -     CBC and Auto Differential; Future  -     Basic Metabolic Panel; Future  Coronary artery disease involving native coronary artery of native heart without angina pectoris  Anemia, unspecified type  -     CBC and Auto Differential; Future  -     Basic Metabolic Panel; Future  -     Iron; Future  -     Vitamin B12; Future  -     Ferritin; Future  Stage 3 chronic kidney disease, unspecified whether stage 3a or 3b CKD (Multi)  Atherosclerosis of aorta (CMS-HCC)  Abdominal aortic aneurysm (AAA) without rupture, unspecified part (CMS-HCC)  Atherosclerosis of renal artery (CMS-HCC)  Nocturia  -     tamsulosin (Flomax) 0.4 mg 24 hr capsule; Take 1 capsule (0.4 mg) by mouth once daily.       Refilled meds.  Discussed diet. Start using some nutritional milkshakes (such as Boost or Ensure) daily to help calorie intake. Monitor wt.   Encouraged to stay active.  Follow-up in 6 months for recheck HTN, CKD, Anemia, AND AWE with nonfasting labs the week before. 40min

## 2024-06-21 NOTE — PATIENT INSTRUCTIONS
Get nonfasting labs a few days prior to your follow up appointment in December.  Get some nutritional shakes to drink (such as Boost or Ensure)

## 2024-08-19 ENCOUNTER — APPOINTMENT (OUTPATIENT)
Dept: PRIMARY CARE | Facility: CLINIC | Age: 89
End: 2024-08-19
Payer: MEDICARE

## 2024-08-19 VITALS
SYSTOLIC BLOOD PRESSURE: 140 MMHG | TEMPERATURE: 97 F | DIASTOLIC BLOOD PRESSURE: 76 MMHG | HEART RATE: 77 BPM | WEIGHT: 131 LBS | OXYGEN SATURATION: 100 % | BODY MASS INDEX: 17.77 KG/M2

## 2024-08-19 DIAGNOSIS — L98.9 SKIN LESION OF BACK: Primary | ICD-10-CM

## 2024-08-19 PROCEDURE — 1036F TOBACCO NON-USER: CPT | Performed by: PHYSICIAN ASSISTANT

## 2024-08-19 PROCEDURE — 1160F RVW MEDS BY RX/DR IN RCRD: CPT | Performed by: PHYSICIAN ASSISTANT

## 2024-08-19 PROCEDURE — 3077F SYST BP >= 140 MM HG: CPT | Performed by: PHYSICIAN ASSISTANT

## 2024-08-19 PROCEDURE — 1159F MED LIST DOCD IN RCRD: CPT | Performed by: PHYSICIAN ASSISTANT

## 2024-08-19 PROCEDURE — 3078F DIAST BP <80 MM HG: CPT | Performed by: PHYSICIAN ASSISTANT

## 2024-08-19 PROCEDURE — 99213 OFFICE O/P EST LOW 20 MIN: CPT | Performed by: PHYSICIAN ASSISTANT

## 2024-08-19 RX ORDER — AMOXICILLIN 500 MG/1
500 TABLET, FILM COATED ORAL
COMMUNITY
Start: 2024-08-16

## 2024-08-19 NOTE — PROGRESS NOTES
"Subjective   Patient ID: Massimo Perez is a 89 y.o. male who presents for Suspicious Skin Lesion (On back, dark, itchy x \" a while\").    HPI   Patient complains of a few skin lesions on back over the past year or 2 but one of them seems to get more itchy than the others.   Saw dermatologist a few months ago and they weren't concerned about the lesion.  Was told to just moisturize it.  He has been inconsistent with that.      Review of Systems    Objective   /76   Pulse 77   Temp 36.1 °C (97 °F)   Wt 59.4 kg (131 lb)   SpO2 100%   BMI 17.77 kg/m²     Physical Exam  Vitals and nursing note reviewed.   HENT:      Head: Normocephalic.   Eyes:      General: No scleral icterus.  Cardiovascular:      Rate and Rhythm: Normal rate and regular rhythm.   Pulmonary:      Effort: Pulmonary effort is normal.      Breath sounds: Normal breath sounds.   Skin:     General: Skin is warm and dry.      Comments: Has scattered brown nevi on back and some seborrheic keratoses on back.   Neurological:      Mental Status: He is alert.   Psychiatric:         Mood and Affect: Affect normal.       Assessment/Plan   Diagnoses and all orders for this visit:  Skin lesion of back     Reassured regarding lesions.  Believe that they get dry from rubbing and start to itch as a result.  Apply moisturizer 1-2 times per day (Aveeno, and/or vaseline).    Follow up in 12/2024 as scheduled.   "

## 2024-10-24 NOTE — TELEPHONE ENCOUNTER
[facility] office called stating patient passed away today at 12:55. Found by daughter and no suspected foul play.

## 2024-10-25 ENCOUNTER — TELEPHONE (OUTPATIENT)
Dept: PRIMARY CARE | Facility: CLINIC | Age: 89
End: 2024-10-25
Payer: MEDICARE

## 2024-12-20 ENCOUNTER — APPOINTMENT (OUTPATIENT)
Dept: PRIMARY CARE | Facility: CLINIC | Age: 89
End: 2024-12-20
Payer: MEDICARE